# Patient Record
Sex: FEMALE | URBAN - METROPOLITAN AREA
[De-identification: names, ages, dates, MRNs, and addresses within clinical notes are randomized per-mention and may not be internally consistent; named-entity substitution may affect disease eponyms.]

---

## 2019-02-13 ENCOUNTER — RASH (OUTPATIENT)
Dept: URBAN - METROPOLITAN AREA CLINIC 36 | Facility: CLINIC | Age: 64
Setting detail: DERMATOLOGY
End: 2019-02-13

## 2019-02-13 DIAGNOSIS — D48.5 NEOPLASM OF UNCERTAIN BEHAVIOR OF SKIN: ICD-10-CM

## 2019-02-13 PROCEDURE — 99202 OFFICE O/P NEW SF 15 MIN: CPT

## 2019-02-13 RX ORDER — CRISABOROLE 20 MG/G
1 APPLICATION OINTMENT TOPICAL BID
Qty: 60 | Refills: 6
Start: 2019-02-13

## 2019-02-13 RX ORDER — DOXEPIN HYDROCHLORIDE 50 MG/G
1 APPLICATION CREAM TOPICAL DAILY
Qty: 45 | Refills: 3
Start: 2019-02-13

## 2020-11-09 ENCOUNTER — OFFICE VISIT (OUTPATIENT)
Dept: URBAN - METROPOLITAN AREA CLINIC 17 | Facility: CLINIC | Age: 65
End: 2020-11-09
Payer: COMMERCIAL

## 2020-11-09 DIAGNOSIS — K21.00 GASTROESOPHAGEAL REFLUX DISEASE WITH ESOPHAGITIS WITHOUT HEMORRHAGE: ICD-10-CM

## 2020-11-09 DIAGNOSIS — K57.30 DIVERTICULAR DISEASE OF COLON: ICD-10-CM

## 2020-11-09 DIAGNOSIS — Z86.010 PERSONAL HISTORY OF COLONIC POLYPS: ICD-10-CM

## 2020-11-09 DIAGNOSIS — K92.89 GAS BLOAT SYNDROME: ICD-10-CM

## 2020-11-09 DIAGNOSIS — K64.1 GRADE II HEMORRHOIDS: ICD-10-CM

## 2020-11-09 DIAGNOSIS — G47.33 OBSTRUCTIVE SLEEP APNEA: ICD-10-CM

## 2020-11-09 DIAGNOSIS — L29.0 PRURITUS ANI: ICD-10-CM

## 2020-11-09 DIAGNOSIS — E65 CENTRAL OBESITY: ICD-10-CM

## 2020-11-09 DIAGNOSIS — E55.9 VITAMIN D DEFICIENCY DISEASE: ICD-10-CM

## 2020-11-09 PROBLEM — 721704005: Status: ACTIVE | Noted: 2020-11-09

## 2020-11-09 PROBLEM — 248311001: Status: ACTIVE | Noted: 2020-11-09

## 2020-11-09 PROBLEM — 78275009: Status: ACTIVE | Noted: 2020-11-09

## 2020-11-09 PROBLEM — 733657002: Status: ACTIVE | Noted: 2020-11-09

## 2020-11-09 PROBLEM — 90446007: Status: ACTIVE | Noted: 2020-11-09

## 2020-11-09 PROCEDURE — 1036F TOBACCO NON-USER: CPT | Performed by: INTERNAL MEDICINE

## 2020-11-09 PROCEDURE — G8483 FLU IMM NO ADMIN DOC REA: HCPCS | Performed by: INTERNAL MEDICINE

## 2020-11-09 PROCEDURE — 99214 OFFICE O/P EST MOD 30 MIN: CPT | Performed by: INTERNAL MEDICINE

## 2020-11-09 PROCEDURE — G8417 CALC BMI ABV UP PARAM F/U: HCPCS | Performed by: INTERNAL MEDICINE

## 2020-11-09 PROCEDURE — 3017F COLORECTAL CA SCREEN DOC REV: CPT | Performed by: INTERNAL MEDICINE

## 2020-11-09 RX ORDER — DEXLANSOPRAZOLE 60 MG/1
TAKE 1 CAPSULE BY ORAL ROUTE ONCE A DAY (IN THE MORNING) FOR 90 DAYS CAPSULE, DELAYED RELEASE ORAL 1
Qty: 90 | Refills: 3 | Status: ON HOLD | COMMUNITY
Start: 2020-02-28 | End: 2021-02-22

## 2020-11-09 RX ORDER — NICOTINE POLACRILEX 2 MG
GUM BUCCAL
Qty: 0 | Refills: 0 | Status: ACTIVE | COMMUNITY
Start: 1900-01-01

## 2020-11-09 RX ORDER — FLUTICASONE PROPIONATE 50 UG/1
SPRAY, METERED NASAL
Qty: 0 | Refills: 0 | Status: ACTIVE | COMMUNITY
Start: 1900-01-01

## 2020-11-09 RX ORDER — PANTOPRAZOLE SODIUM 40 MG/1
TAKE 1 TABLET (40 MG) BY ORAL ROUTE ONCE DAILY FOR 90 DAYS TABLET, DELAYED RELEASE ORAL 1
Qty: 90 | Refills: 0 | Status: ON HOLD | COMMUNITY
Start: 1900-01-01

## 2020-11-09 RX ORDER — PSEUDOEPHEDRINE HYDROCHLORIDE 120 MG/1
TABLET, FILM COATED, EXTENDED RELEASE ORAL
Qty: 0 | Refills: 0 | Status: ACTIVE | COMMUNITY
Start: 1900-01-01

## 2020-11-09 RX ORDER — HYDROCORTISONE ACETATE 25 MG/1
1 SUPPOSITORY SUPPOSITORY RECTAL TWICE A DAY
Qty: 60 | Refills: 3 | OUTPATIENT
Start: 2020-11-09 | End: 2021-03-09

## 2020-11-09 RX ORDER — TURMERIC 400 MG
CAPSULE ORAL
Qty: 0 | Refills: 0 | Status: ACTIVE | COMMUNITY
Start: 1900-01-01

## 2020-11-09 NOTE — HPI-TODAY'S VISIT:
The pt presents for a f/u office visit. She notes that she is doing well as she is currently using CPAP for sleep apnea and she notes that she continues to awaken at night and she is not sure why this is happening. She notes that she is having constipation but would like to come off the medictiaon as she is currently taking Amitiza. Her [facility] pharmacy benefit manager has approved Linzess for 2021. She states that she will decide if she wants to change from Amitiza to Linzess. The pt notes that she has had intermittent rectal bleeding over the last several months.

## 2021-01-04 ENCOUNTER — ERX REFILL RESPONSE (OUTPATIENT)
Age: 66
End: 2021-01-04

## 2021-01-04 RX ORDER — LUBIPROSTONE 24 UG/1
TAKE 1 CAPSULE BY MOUTH TWICE DAILY WITH FOOD AND WATER CAPSULE, GELATIN COATED ORAL
Qty: 60 | Refills: 0

## 2021-02-05 ENCOUNTER — OFFICE VISIT (OUTPATIENT)
Dept: URBAN - METROPOLITAN AREA CLINIC 17 | Facility: CLINIC | Age: 66
End: 2021-02-05

## 2021-04-14 ENCOUNTER — OFFICE VISIT (OUTPATIENT)
Dept: URBAN - METROPOLITAN AREA TELEHEALTH 2 | Facility: TELEHEALTH | Age: 66
End: 2021-04-14

## 2021-04-14 RX ORDER — PSEUDOEPHEDRINE HYDROCHLORIDE 120 MG/1
TABLET, FILM COATED, EXTENDED RELEASE ORAL
Qty: 0 | Refills: 0 | Status: ACTIVE | COMMUNITY
Start: 1900-01-01

## 2021-04-14 RX ORDER — TURMERIC 400 MG
CAPSULE ORAL
Qty: 0 | Refills: 0 | Status: ACTIVE | COMMUNITY
Start: 1900-01-01

## 2021-04-14 RX ORDER — NICOTINE POLACRILEX 2 MG
GUM BUCCAL
Qty: 0 | Refills: 0 | Status: ACTIVE | COMMUNITY
Start: 1900-01-01

## 2021-04-14 RX ORDER — FLUTICASONE PROPIONATE 50 UG/1
SPRAY, METERED NASAL
Qty: 0 | Refills: 0 | Status: ACTIVE | COMMUNITY
Start: 1900-01-01

## 2021-04-14 RX ORDER — LUBIPROSTONE 24 UG/1
TAKE 1 CAPSULE BY MOUTH TWICE DAILY WITH FOOD AND WATER CAPSULE, GELATIN COATED ORAL
Qty: 60 | Refills: 0 | Status: ACTIVE | COMMUNITY

## 2021-04-14 RX ORDER — PANTOPRAZOLE SODIUM 40 MG/1
TAKE 1 TABLET (40 MG) BY ORAL ROUTE ONCE DAILY FOR 90 DAYS TABLET, DELAYED RELEASE ORAL 1
Qty: 90 | Refills: 0 | Status: ACTIVE | COMMUNITY
Start: 1900-01-01

## 2021-04-19 ENCOUNTER — OFFICE VISIT (OUTPATIENT)
Dept: URBAN - METROPOLITAN AREA TELEHEALTH 2 | Facility: TELEHEALTH | Age: 66
End: 2021-04-19
Payer: COMMERCIAL

## 2021-04-19 DIAGNOSIS — K58.1 IRRITABLE BOWEL SYNDROME WITH CONSTIPATION: ICD-10-CM

## 2021-04-19 DIAGNOSIS — R10.13 DYSPEPSIA: ICD-10-CM

## 2021-04-19 DIAGNOSIS — K21.00 GASTROESOPHAGEAL REFLUX DISEASE WITH ESOPHAGITIS WITHOUT HEMORRHAGE: ICD-10-CM

## 2021-04-19 DIAGNOSIS — K63.89 INTESTINAL BACTERIAL OVERGROWTH: ICD-10-CM

## 2021-04-19 PROBLEM — 428283002: Status: ACTIVE | Noted: 2020-11-09

## 2021-04-19 PROCEDURE — 99214 OFFICE O/P EST MOD 30 MIN: CPT | Performed by: INTERNAL MEDICINE

## 2021-04-19 RX ORDER — FLUTICASONE PROPIONATE 50 UG/1
SPRAY, METERED NASAL
Qty: 0 | Refills: 0 | Status: ACTIVE | COMMUNITY
Start: 1900-01-01

## 2021-04-19 RX ORDER — TURMERIC 400 MG
CAPSULE ORAL
Qty: 0 | Refills: 0 | Status: ACTIVE | COMMUNITY
Start: 1900-01-01

## 2021-04-19 RX ORDER — LUBIPROSTONE 24 UG/1
1 CAPSULE WITH FOOD AND WATER CAPSULE, GELATIN COATED ORAL TWICE A DAY
Qty: 180 CAPSULE | Refills: 3
End: 2022-04-14

## 2021-04-19 RX ORDER — HYOSCYAMINE SULFATE 0.12 MG/1
1 TABLET AS NEEDED TABLET ORAL
Qty: 180 | Refills: 3 | OUTPATIENT
Start: 2021-04-19 | End: 2022-04-14

## 2021-04-19 RX ORDER — PSEUDOEPHEDRINE HYDROCHLORIDE 120 MG/1
TABLET, FILM COATED, EXTENDED RELEASE ORAL
Qty: 0 | Refills: 0 | Status: ACTIVE | COMMUNITY
Start: 1900-01-01

## 2021-04-19 RX ORDER — RIFAXIMIN 550 MG/1
1 TABLET TABLET ORAL TWICE A DAY
Qty: 60 | Refills: 3 | OUTPATIENT
Start: 2021-04-19 | End: 2021-08-17

## 2021-04-19 RX ORDER — LUBIPROSTONE 24 UG/1
TAKE 1 CAPSULE BY MOUTH TWICE DAILY WITH FOOD AND WATER CAPSULE, GELATIN COATED ORAL
Qty: 60 | Refills: 0 | Status: ACTIVE | COMMUNITY

## 2021-04-19 RX ORDER — PANTOPRAZOLE SODIUM 40 MG/1
TAKE 1 TABLET (40 MG) BY ORAL ROUTE ONCE DAILY FOR 90 DAYS TABLET, DELAYED RELEASE ORAL 1
Qty: 90 | Refills: 0 | Status: ACTIVE | COMMUNITY
Start: 1900-01-01

## 2021-04-19 RX ORDER — NICOTINE POLACRILEX 2 MG
GUM BUCCAL
Qty: 0 | Refills: 0 | Status: ACTIVE | COMMUNITY
Start: 1900-01-01

## 2021-04-19 RX ORDER — OMEPRAZOLE 40 MG/1
1 CAPSULE 30 MINUTES BEFORE MORNING MEAL CAPSULE, DELAYED RELEASE ORAL BID
Qty: 180 | Refills: 3 | OUTPATIENT
Start: 2021-04-19

## 2021-04-19 RX ORDER — PANTOPRAZOLE SODIUM 40 MG/1
TAKE 1 TABLET (40 MG) BY ORAL ROUTE ONCE DAILY FOR 90 DAYS TABLET, DELAYED RELEASE ORAL 1
OUTPATIENT

## 2021-04-19 NOTE — HPI-TODAY'S VISIT:
The pt with history of GERD, gastritis and IBS with constipation who presents for a f/u office visit. The pt continues to complain of excessive gas and bloating with abdominal cramping as well. She notes that the pantoprazole did not work for her.  She notes that she is having embarassing gas and bloating.

## 2021-05-12 ENCOUNTER — TELEPHONE ENCOUNTER (OUTPATIENT)
Dept: URBAN - METROPOLITAN AREA CLINIC 23 | Facility: CLINIC | Age: 66
End: 2021-05-12

## 2021-09-01 ENCOUNTER — OFFICE VISIT (OUTPATIENT)
Dept: URBAN - METROPOLITAN AREA TELEHEALTH 2 | Facility: TELEHEALTH | Age: 66
End: 2021-09-01
Payer: COMMERCIAL

## 2021-09-01 DIAGNOSIS — K21.00 GASTROESOPHAGEAL REFLUX DISEASE WITH ESOPHAGITIS WITHOUT HEMORRHAGE: ICD-10-CM

## 2021-09-01 DIAGNOSIS — E55.9 VITAMIN D DEFICIENCY DISEASE: ICD-10-CM

## 2021-09-01 DIAGNOSIS — K58.1 IRRITABLE BOWEL SYNDROME WITH CONSTIPATION: ICD-10-CM

## 2021-09-01 DIAGNOSIS — K92.89 GAS BLOAT SYNDROME: ICD-10-CM

## 2021-09-01 PROBLEM — 34713006: Status: ACTIVE | Noted: 2020-11-09

## 2021-09-01 PROBLEM — 266433003: Status: ACTIVE | Noted: 2020-11-09

## 2021-09-01 PROBLEM — 440630006: Status: ACTIVE | Noted: 2021-04-19

## 2021-09-01 PROCEDURE — 99213 OFFICE O/P EST LOW 20 MIN: CPT | Performed by: INTERNAL MEDICINE

## 2021-09-01 RX ORDER — FLUTICASONE PROPIONATE 50 UG/1
SPRAY, METERED NASAL
Qty: 0 | Refills: 0 | Status: ACTIVE | COMMUNITY
Start: 1900-01-01

## 2021-09-01 RX ORDER — OMEPRAZOLE 40 MG/1
1 CAPSULE 30 MINUTES BEFORE MORNING MEAL CAPSULE, DELAYED RELEASE ORAL BID
Qty: 180 | Refills: 3 | Status: ACTIVE | COMMUNITY
Start: 2021-04-19

## 2021-09-01 RX ORDER — TURMERIC 400 MG
CAPSULE ORAL
Qty: 0 | Refills: 0 | Status: ACTIVE | COMMUNITY
Start: 1900-01-01

## 2021-09-01 RX ORDER — PSEUDOEPHEDRINE HYDROCHLORIDE 120 MG/1
TABLET, FILM COATED, EXTENDED RELEASE ORAL
Qty: 0 | Refills: 0 | Status: ACTIVE | COMMUNITY
Start: 1900-01-01

## 2021-09-01 RX ORDER — RIFAXIMIN 550 MG/1
1 TABLET TABLET ORAL TWICE A DAY
Qty: 180 TABLET | Refills: 1 | OUTPATIENT
Start: 2021-09-01 | End: 2022-02-27

## 2021-09-01 RX ORDER — NICOTINE POLACRILEX 2 MG
GUM BUCCAL
Qty: 0 | Refills: 0 | Status: ACTIVE | COMMUNITY
Start: 1900-01-01

## 2021-09-01 RX ORDER — LUBIPROSTONE 24 UG/1
1 CAPSULE WITH FOOD AND WATER CAPSULE, GELATIN COATED ORAL TWICE A DAY
Qty: 180 CAPSULE | Refills: 3 | Status: ACTIVE | COMMUNITY
End: 2022-04-14

## 2021-09-01 RX ORDER — HYOSCYAMINE SULFATE 0.12 MG/1
1 TABLET AS NEEDED TABLET ORAL
Qty: 180 | Refills: 3 | Status: ACTIVE | COMMUNITY
Start: 2021-04-19 | End: 2022-04-14

## 2021-09-01 NOTE — HPI-TODAY'S VISIT:
The pt has noted significant improvement in her symptoms fo increased gas and bloating. She was seen in 4/2021 and was prescribed Xifaxan, Omeprazole, hyoscyamine and Miralax.  The pt notes that her son was diagnosed with COVID and she has had no problems with COVID herself.

## 2022-01-25 ENCOUNTER — DASHBOARD ENCOUNTERS (OUTPATIENT)
Age: 67
End: 2022-01-25

## 2022-01-26 ENCOUNTER — OFFICE VISIT (OUTPATIENT)
Dept: URBAN - METROPOLITAN AREA TELEHEALTH 2 | Facility: TELEHEALTH | Age: 67
End: 2022-01-26
Payer: COMMERCIAL

## 2022-01-26 DIAGNOSIS — K92.89 GAS BLOAT SYNDROME: ICD-10-CM

## 2022-01-26 DIAGNOSIS — L75.0 BODY ODOR: ICD-10-CM

## 2022-01-26 DIAGNOSIS — Z12.11 SCREEN FOR COLON CANCER: ICD-10-CM

## 2022-01-26 DIAGNOSIS — R19.6 HALITOSIS: ICD-10-CM

## 2022-01-26 PROBLEM — 79879001: Status: ACTIVE | Noted: 2022-01-26

## 2022-01-26 PROCEDURE — 99214 OFFICE O/P EST MOD 30 MIN: CPT | Performed by: INTERNAL MEDICINE

## 2022-01-26 RX ORDER — FLUTICASONE PROPIONATE 50 UG/1
SPRAY, METERED NASAL
Qty: 0 | Refills: 0 | Status: ACTIVE | COMMUNITY
Start: 1900-01-01

## 2022-01-26 RX ORDER — FAMOTIDINE 40 MG/1
1 TABLET AT BEDTIME AS NEEDED TABLET, FILM COATED ORAL ONCE A DAY
Qty: 90 TABLET | Refills: 3 | OUTPATIENT
Start: 2022-01-26

## 2022-01-26 RX ORDER — OMEPRAZOLE 40 MG/1
1 CAPSULE 30 MINUTES BEFORE MORNING MEAL CAPSULE, DELAYED RELEASE ORAL BID
Qty: 180 | Refills: 3 | Status: ACTIVE | COMMUNITY
Start: 2021-04-19

## 2022-01-26 RX ORDER — LUBIPROSTONE 24 UG/1
1 CAPSULE WITH FOOD AND WATER CAPSULE, GELATIN COATED ORAL TWICE A DAY
Qty: 180 CAPSULE | Refills: 3 | Status: ACTIVE | COMMUNITY
End: 2022-04-14

## 2022-01-26 RX ORDER — TURMERIC 400 MG
CAPSULE ORAL
Qty: 0 | Refills: 0 | Status: ACTIVE | COMMUNITY
Start: 1900-01-01

## 2022-01-26 RX ORDER — HYOSCYAMINE SULFATE 0.12 MG/1
1 TABLET AS NEEDED TABLET ORAL
Qty: 180 | Refills: 3 | Status: ACTIVE | COMMUNITY
Start: 2021-04-19 | End: 2022-04-14

## 2022-01-26 RX ORDER — RIFAXIMIN 550 MG/1
1 TABLET TABLET ORAL TWICE A DAY
Qty: 180 TABLET | Refills: 1 | Status: ACTIVE | COMMUNITY
Start: 2021-09-01 | End: 2022-02-27

## 2022-01-26 RX ORDER — NICOTINE POLACRILEX 2 MG
GUM BUCCAL
Qty: 0 | Refills: 0 | Status: ACTIVE | COMMUNITY
Start: 1900-01-01

## 2022-01-26 RX ORDER — PSEUDOEPHEDRINE HYDROCHLORIDE 120 MG/1
TABLET, FILM COATED, EXTENDED RELEASE ORAL
Qty: 0 | Refills: 0 | Status: ACTIVE | COMMUNITY
Start: 1900-01-01

## 2022-01-26 NOTE — HPI-TODAY'S VISIT:
The patient has a history of GERD, halitosis, and gas bloat syndrome who presents for evaluation of excessive halitosis with bloating.  The pt notes that she has been intemittent fasting and she notes that she has taken Xifaxan, hyoscyamine, and omeprazole. She notes that she is having tremendous body odor.  She is due for colon in 5/2022.

## 2022-01-27 ENCOUNTER — TELEPHONE ENCOUNTER (OUTPATIENT)
Dept: URBAN - METROPOLITAN AREA CLINIC 105 | Facility: CLINIC | Age: 67
End: 2022-01-27

## 2022-01-27 RX ORDER — HYOSCYAMINE SULFATE 0.12 MG/1
1 TABLET AS NEEDED TABLET ORAL
Qty: 180 | Refills: 3
Start: 2021-04-19 | End: 2023-01-22

## 2022-01-27 RX ORDER — OMEPRAZOLE 40 MG/1
1 CAPSULE 30 MINUTES BEFORE MORNING MEAL CAPSULE, DELAYED RELEASE ORAL BID
Qty: 180 | Refills: 3
Start: 2021-04-19

## 2022-02-01 ENCOUNTER — OFFICE VISIT (OUTPATIENT)
Dept: URBAN - METROPOLITAN AREA TELEHEALTH 2 | Facility: TELEHEALTH | Age: 67
End: 2022-02-01
Payer: COMMERCIAL

## 2022-02-01 ENCOUNTER — OFFICE VISIT (OUTPATIENT)
Dept: URBAN - METROPOLITAN AREA TELEHEALTH 2 | Facility: TELEHEALTH | Age: 67
End: 2022-02-01

## 2022-02-01 DIAGNOSIS — K58.9 IBS (IRRITABLE BOWEL SYNDROME): ICD-10-CM

## 2022-02-01 DIAGNOSIS — R14.0 ABDOMINAL BLOATING: ICD-10-CM

## 2022-02-01 DIAGNOSIS — K21.9 ACID REFLUX: ICD-10-CM

## 2022-02-01 PROCEDURE — 97802 MEDICAL NUTRITION INDIV IN: CPT | Performed by: DIETITIAN, REGISTERED

## 2022-02-01 RX ORDER — PSEUDOEPHEDRINE HYDROCHLORIDE 120 MG/1
TABLET, FILM COATED, EXTENDED RELEASE ORAL
Qty: 0 | Refills: 0 | Status: ACTIVE | COMMUNITY
Start: 1900-01-01

## 2022-02-01 RX ORDER — RIFAXIMIN 550 MG/1
1 TABLET TABLET ORAL TWICE A DAY
Qty: 180 TABLET | Refills: 1 | Status: ACTIVE | COMMUNITY
Start: 2021-09-01 | End: 2022-02-27

## 2022-02-01 RX ORDER — LUBIPROSTONE 24 UG/1
1 CAPSULE WITH FOOD AND WATER CAPSULE, GELATIN COATED ORAL TWICE A DAY
Qty: 180 CAPSULE | Refills: 3 | Status: ACTIVE | COMMUNITY
End: 2022-04-14

## 2022-02-01 RX ORDER — OMEPRAZOLE 40 MG/1
1 CAPSULE 30 MINUTES BEFORE MORNING MEAL CAPSULE, DELAYED RELEASE ORAL BID
Qty: 180 | Refills: 3 | Status: ACTIVE | COMMUNITY
Start: 2021-04-19

## 2022-02-01 RX ORDER — TURMERIC 400 MG
CAPSULE ORAL
Qty: 0 | Refills: 0 | Status: ACTIVE | COMMUNITY
Start: 1900-01-01

## 2022-02-01 RX ORDER — FLUTICASONE PROPIONATE 50 UG/1
SPRAY, METERED NASAL
Qty: 0 | Refills: 0 | Status: ACTIVE | COMMUNITY
Start: 1900-01-01

## 2022-02-01 RX ORDER — HYOSCYAMINE SULFATE 0.12 MG/1
1 TABLET AS NEEDED TABLET ORAL
Qty: 180 | Refills: 3 | Status: ACTIVE | COMMUNITY
Start: 2021-04-19 | End: 2023-01-22

## 2022-02-01 RX ORDER — NICOTINE POLACRILEX 2 MG
GUM BUCCAL
Qty: 0 | Refills: 0 | Status: ACTIVE | COMMUNITY
Start: 1900-01-01

## 2022-02-01 RX ORDER — FAMOTIDINE 40 MG/1
1 TABLET AT BEDTIME AS NEEDED TABLET, FILM COATED ORAL ONCE A DAY
Qty: 90 TABLET | Refills: 3 | Status: ACTIVE | COMMUNITY
Start: 2022-01-26

## 2022-03-03 ENCOUNTER — TELEPHONE ENCOUNTER (OUTPATIENT)
Dept: URBAN - METROPOLITAN AREA CLINIC 23 | Facility: CLINIC | Age: 67
End: 2022-03-03

## 2022-03-03 RX ORDER — RIFAXIMIN 550 MG/1
1 TABLET TABLET ORAL TWICE A DAY
Qty: 180 TABLET | Refills: 1
Start: 2021-09-01 | End: 2022-08-30

## 2022-05-10 ENCOUNTER — OFFICE VISIT (OUTPATIENT)
Dept: URBAN - METROPOLITAN AREA SURGERY CENTER 16 | Facility: SURGERY CENTER | Age: 67
End: 2022-05-10
Payer: COMMERCIAL

## 2022-05-10 DIAGNOSIS — K63.5 BENIGN COLON POLYP: ICD-10-CM

## 2022-05-10 DIAGNOSIS — Z12.11 COLON CANCER SCREENING: ICD-10-CM

## 2022-05-10 DIAGNOSIS — K63.89 BACTERIAL OVERGROWTH SYNDROME: ICD-10-CM

## 2022-05-10 PROCEDURE — 45380 COLONOSCOPY AND BIOPSY: CPT | Performed by: INTERNAL MEDICINE

## 2022-05-10 PROCEDURE — G8907 PT DOC NO EVENTS ON DISCHARG: HCPCS | Performed by: INTERNAL MEDICINE

## 2022-05-10 RX ORDER — TURMERIC 400 MG
CAPSULE ORAL
Qty: 0 | Refills: 0 | Status: ACTIVE | COMMUNITY
Start: 1900-01-01

## 2022-05-10 RX ORDER — PSEUDOEPHEDRINE HYDROCHLORIDE 120 MG/1
TABLET, FILM COATED, EXTENDED RELEASE ORAL
Qty: 0 | Refills: 0 | Status: ACTIVE | COMMUNITY
Start: 1900-01-01

## 2022-05-10 RX ORDER — FLUTICASONE PROPIONATE 50 UG/1
SPRAY, METERED NASAL
Qty: 0 | Refills: 0 | Status: ACTIVE | COMMUNITY
Start: 1900-01-01

## 2022-05-10 RX ORDER — HYOSCYAMINE SULFATE 0.12 MG/1
1 TABLET AS NEEDED TABLET ORAL
Qty: 180 | Refills: 3 | Status: ACTIVE | COMMUNITY
Start: 2021-04-19 | End: 2023-01-22

## 2022-05-10 RX ORDER — FAMOTIDINE 40 MG/1
1 TABLET AT BEDTIME AS NEEDED TABLET, FILM COATED ORAL ONCE A DAY
Qty: 90 TABLET | Refills: 3 | Status: ACTIVE | COMMUNITY
Start: 2022-01-26

## 2022-05-10 RX ORDER — NICOTINE POLACRILEX 2 MG
GUM BUCCAL
Qty: 0 | Refills: 0 | Status: ACTIVE | COMMUNITY
Start: 1900-01-01

## 2022-05-10 RX ORDER — OMEPRAZOLE 40 MG/1
1 CAPSULE 30 MINUTES BEFORE MORNING MEAL CAPSULE, DELAYED RELEASE ORAL BID
Qty: 180 | Refills: 3 | Status: ACTIVE | COMMUNITY
Start: 2021-04-19

## 2022-05-10 RX ORDER — RIFAXIMIN 550 MG/1
1 TABLET TABLET ORAL TWICE A DAY
Qty: 180 TABLET | Refills: 1 | Status: ACTIVE | COMMUNITY
Start: 2021-09-01 | End: 2022-08-30

## 2022-06-07 ENCOUNTER — TELEPHONE ENCOUNTER (OUTPATIENT)
Dept: URBAN - METROPOLITAN AREA CLINIC 105 | Facility: CLINIC | Age: 67
End: 2022-06-07

## 2022-06-07 PROBLEM — 119292006: Status: ACTIVE | Noted: 2020-11-09

## 2022-06-07 PROBLEM — 65619001: Status: ACTIVE | Noted: 2022-06-07
